# Patient Record
Sex: FEMALE | Race: OTHER
[De-identification: names, ages, dates, MRNs, and addresses within clinical notes are randomized per-mention and may not be internally consistent; named-entity substitution may affect disease eponyms.]

---

## 2020-01-30 ENCOUNTER — HOSPITAL ENCOUNTER (OUTPATIENT)
Dept: HOSPITAL 72 - RAD | Age: 65
Discharge: HOME | End: 2020-01-30
Payer: COMMERCIAL

## 2020-01-30 VITALS — BODY MASS INDEX: 26.12 KG/M2 | WEIGHT: 153 LBS | HEIGHT: 64 IN

## 2020-01-30 VITALS — DIASTOLIC BLOOD PRESSURE: 69 MMHG | SYSTOLIC BLOOD PRESSURE: 118 MMHG

## 2020-01-30 VITALS — SYSTOLIC BLOOD PRESSURE: 128 MMHG | DIASTOLIC BLOOD PRESSURE: 77 MMHG

## 2020-01-30 DIAGNOSIS — M47.27: Primary | ICD-10-CM

## 2020-01-30 PROCEDURE — 76000 FLUOROSCOPY <1 HR PHYS/QHP: CPT

## 2020-01-30 PROCEDURE — 64484 NJX AA&/STRD TFRM EPI L/S EA: CPT

## 2020-01-30 PROCEDURE — 64483 NJX AA&/STRD TFRM EPI L/S 1: CPT

## 2020-01-30 NOTE — BRIEF OPERATIVE NOTE
Immediate Post Operative Note


Operative Note


Chief Complaint:  Lower back pain with radiation down the left thigh


Pre-op Diagnosis:


lumbosacral spondylosis with radiculopathy.


Procedure:


Left L4-L5 and L5-S1 intra-articular facet injections with platelet rich plasma.


Post-op Diagnosis:


Lumbosacral spondylosis with radiculopathy


Post-op Diagnosis:  same as pre-op


Findings:  consistent w/pre-op dx studies


Surgeon:  Angie Flower MD


Assistant:  none


Additional Surgeons:  none


Anesthesiologist:  none


Anesthesia:  local


Specimen:  none


Complications:  none


Condition:  stable


Fluids:  none


Estimated Blood Loss:  none


Drains:  none


Packing:  none


Tourniquet time:  0 - min


Implant(s) used?:  No











Angie Flower MD Jan 30, 2020 17:10

## 2020-01-30 NOTE — DIAGNOSTIC IMAGING REPORT
INDICATION:  Pain, intraoperative

 

TECHNIQUE: Intraoperative imaging

 

Fluoroscopy time: 32.7 seconds

Total dose: 0.25070 mGym2

Total number of images: 3

 

COMPARISON: None

 

FINDINGS: Intraoperative images demonstrate contrast injection in the region of the

left L4-5 facet joint

 

IMPRESSION: Intraoperative imaging, as described

## 2020-01-30 NOTE — OPERATIVE NOTE - PDOC
Operative Note


Operative Note


Date of Operation/Procedure:  Jan 30, 2020


Chief Complaint:  Lower back pain with radiation down the left thigh


Pre-op Diagnosis:


lumbosacral spondylosis with radiculopathy.


Procedure:


Left L4-L5 and L5-S1 intra-articular facet injections with platelet rich plasma.


Post-op Diagnosis:


Lumbosacral spondylosis with radiculopathy


Post-op Diagnosis:  same as pre-op


Operative Findings:  consistent w/pre-op dx studies


Surgeon:  Angie Flower MD


Assistant:  none


Additional Surgeons:  none


Anesthesiologist:  none


Anesthesia:  local


Specimen:  none


Complications:  none


Condition:  stable


Fluids:  none


Estimated Blood Loss:  none


Drains:  none


Packing:  none


Tourniquet time:  0 - min


Implant(s) used?:  No


Indications for Procedure


The patient has a date of loss of 9/22/18 and has failed conservative 

treatment.  She is here for a therapeutic left-sided L4-L5 and L5-S1 intra-

articular facet injections with platelet rich plasma..


Description of Procedure


The patient was seen and identified in the preoperative area. Risks, benefits, 

complications, and alternatives were discussed with the patient. 


The patient agreed to proceed with the procedure and signed the consent. The 

preop nurse removed 10 mL of blood from the patient's AC under sterile 

conditions and placed it in the PRP tube.  The tube was then placed in the 

centrifuge and spun for 10 minutes at 4000 rpm.  The patient was then placed in 

the prone position, and lumbosacral area was prepped with Betadine x 3 and 

draped in the usual sterile fashion. Critical pause was taken. Using right 

oblique fluoroscopy, the left L4-L5 and L5-S1 facet joints were identified, and 

skin and deeper tissues were anesthetized with 1% lidocaine. We used 25-gauge 

3.5-inch spinal needles for the procedure. The first needle was guided intra-

articularly by fluoroscopy to the left L4-L5 joint. The second needle was 

guided intra-articularly by fluoroscopy to the left L5-S1 joint. Tip position 

was confirmed on lateral fluoroscopy. After negative aspiration of CSF and 

blood with no paresthesias, 0.5mL of Isoview M200 was injected illustrating 

excellent arthrogram. Again after negative aspiration of CSF and blood with no 

paresthesias, 1 mL of PRP was was injected into each joint.





The needles were removed, skin was cleansed, and bandages were applied. The 

patient tolerated the procedure well without complications, and was discharged 

from recovery room after meeting discharge.





Follow up:  The patient will follow up in two weeks.  She was told to avoid 

NSAID, jacuzzi baths and icing the area.











Angie Flower MD Jan 30, 2020 17:19

## 2020-01-30 NOTE — SHORT STAY SURGERY H&P
History of Present Illness


History of Present Illness


Chief Complaint


Left lower back pain with radiculopathy.


HPI


Nisha Holt is a 64 year old female who was admitted on  for Lumbrosacral 

Spondylosis with radiculopathy.





Patient History


Allergies:  


Uncoded Allergies:  


     PENICILLIN (Allergy, Unknown, skin rashes, 1/30/20)


PAST MEDICAL HISTORY:  


(1) Lumbosacral spondylosis with radiculopathy


Patient History Narrative


The patient has a date of loss of 9/22/2018 and suffered lower back pain and 

radiation down the left leg.  She has failed conservative treatment and is here 

for her first therapeutic facet injections.





Medication History


Scheduled


Rosuvastatin Calcium* (Crestor*), Unknown Dose ORAL DAILY, (Reported)





Miscellaneous Medications


[thyroid pill], PO, (Reported)





Review of Systems


Cardiovascular:  Denies: no symptoms, see HPI, hypertension, CAD - stable, 

angina, MI, CABG, dysrhythmia, CHF, valvular disease, rheumatic heart disease, 

peripheral vascular disease, source of infx - skin, source of infx-indw cath, 

source of infx-prosthesis, other


Respiratory:  Denies: no symptoms, see HPI, asthma, chronic bronchitis, 

pneumonia, COPD, URI, tuberculosis, sleep apnea, CPAP, home 02, other


Skeletal:  Reports: spinal disc disease, trauma


Gastrointestinal:  Denies: no symptoms, see HPI, obesity, peptic ulcer disease, 

gastro esophageal reflux disease, hiatal hernia, jaundice, hepatitis A,B,C, 

other


Genitourinary:  Denies: no symptoms, see HPI, renal insufficiency, endstage 

renal disease, dialysis, UTI, urinary retention, BPH, other


Neurologic:  Denies: no symptoms, see HPI, seizure, stroke/TIA, neuropathy, 

neuro muscular disease, other


Endocrine:  Denies: no symptoms, see HPI, diabetes - type 1, diabetes - type 2, 

thyroid, post menopausal, other


Hematologic:  Denies: no symptoms, see HPI, anemia, coagulopathy, prior 

transfusion, other





Physical Exam


Vital Signs





Last Vital Signs








  Date Time  Temp Pulse Resp B/P (MAP) Pulse Ox O2 Delivery O2 Flow Rate FiO2


 


1/30/20 09:39      Room Air  


 


1/30/20 09:38 97.5 76 18 118/69 98   








Skin:  normal


HENT:  normal


Heart:  normal


Lungs:  normal


Abdomen:  normal


Extremities:  normal


Genitourinary:  normal





Plan


Plan of Care


Left L4-L5 and L5-S1 intra-articular facet injections with platelet rich plasma.


Preop Interventions


see HPI


Attestation


Are the patient's medical conditions optimized for surgery?


Attestation Response:  yes











Angie Flower MD Jan 30, 2020 09:59

## 2020-01-30 NOTE — DIAGNOSTIC IMAGING REPORT
INDICATION:  Pain, intraoperative

 

TECHNIQUE: Intraoperative imaging

 

Fluoroscopy time: 32.7 seconds

Total dose: 0.22923 mGym2

Total number of images: 3

 

COMPARISON: None

 

FINDINGS: Intraoperative images demonstrate contrast injection in the region of the

left L4-5 facet joint

 

IMPRESSION: Intraoperative imaging, as described

## 2020-01-30 NOTE — PRE-PROCEDURE NOTE/ATTESTATION
Pre-Procedure Note/Attestation


Complete Prior to Procedure


Planned Procedure:  left


Procedure Narrative:


Left L4-L5 and L5-S1 intra-articular facet injections with platelet rich plasma.





Indications for Procedure


Pre-Operative Diagnosis:


lumbosacral spondylosis with radiculopathy.





Attestation


I attest that I discussed the nature of the procedure; its benefits; risks and 

complications; and alternatives (and the risks and benefits of such alternatives

), prior to the procedure, with the patient (or the patient's legal 

representative).





I attest that, if there was a reasonable possibility of needing a blood 

transfusion, the patient (or the patient's legal representative) was given the 

[place] [employer] standardized written summary, pursuant 

to the [employer] Blood Safety Act ([place] Health and Safety Code # 1645, as 

amended).





I attest that I re-evaluated the patient just prior to the surgery and that 

there has been no change in the patient's H&P, except as documented below:











Angie Flower MD Jan 30, 2020 09:59

## 2023-12-08 NOTE — DISCHARGE SUMMARY
Discharge Summary


Hospital Course


Date of Admission


01/30/2020


Date of Discharge


01/30/2020


Admitting Diagnosis


Lumbosacral spondylosis with radiculopathy


Reason for Hospitalization:  short stay for injection


HPI


Nisha Holt is a 64 year old female who was admitted on  for Lumbrosacral 

Spondylosis


Consultations


none


Procedures


Left L4-L5 and L5-S1 intra-articular facet injections with platelet rich plasma.


Hospital Course


short stay





Discharge


Condition Upon Discharge:  improving


Discharge Disposition


Patient was discharged to home accompanied by daughter.


Discharge Diagnoses:  


(1) Lumbosacral spondylosis with radiculopathy





Discharge Instructions


Discharge Instructions


Assessment


This is a 64 year old female with lumbosacral spondylosis, improving slightly 

after the procedure.  She states her leg pain is improving.


Follow up with:  Dr. Flower





For Surgical Patients


Dressing Care:  may change


May shower:  Yes


Contact your physician for:  bleeding, pain, tenderness, redness, swelling, 

yellowish discharge in the op. site











Angie Flower MD Jan 30, 2020 17:16
Normal